# Patient Record
Sex: MALE | Race: WHITE | NOT HISPANIC OR LATINO | Employment: FULL TIME | URBAN - METROPOLITAN AREA
[De-identification: names, ages, dates, MRNs, and addresses within clinical notes are randomized per-mention and may not be internally consistent; named-entity substitution may affect disease eponyms.]

---

## 2021-03-28 ENCOUNTER — APPOINTMENT (OUTPATIENT)
Dept: VACCINE CLINIC | Facility: HOSPITAL | Age: 66
End: 2021-03-28

## 2022-07-08 ENCOUNTER — OFFICE VISIT (OUTPATIENT)
Dept: INTERNAL MEDICINE | Facility: CLINIC | Age: 67
End: 2022-07-08

## 2022-07-08 VITALS
SYSTOLIC BLOOD PRESSURE: 130 MMHG | HEIGHT: 73 IN | DIASTOLIC BLOOD PRESSURE: 86 MMHG | HEART RATE: 84 BPM | TEMPERATURE: 98 F | BODY MASS INDEX: 26 KG/M2 | OXYGEN SATURATION: 98 % | WEIGHT: 196.2 LBS

## 2022-07-08 DIAGNOSIS — F17.200 CURRENT EVERY DAY SMOKER: ICD-10-CM

## 2022-07-08 DIAGNOSIS — Z13.1 SCREENING FOR DIABETES MELLITUS: ICD-10-CM

## 2022-07-08 DIAGNOSIS — R03.0 BORDERLINE BLOOD PRESSURE: ICD-10-CM

## 2022-07-08 DIAGNOSIS — Z13.29 SCREENING FOR THYROID DISORDER: ICD-10-CM

## 2022-07-08 DIAGNOSIS — Z13.220 SCREENING, LIPID: ICD-10-CM

## 2022-07-08 DIAGNOSIS — Z76.89 ENCOUNTER TO ESTABLISH CARE: ICD-10-CM

## 2022-07-08 DIAGNOSIS — R55 SYNCOPE, TUSSIVE: ICD-10-CM

## 2022-07-08 DIAGNOSIS — R05.4 SYNCOPE, TUSSIVE: ICD-10-CM

## 2022-07-08 DIAGNOSIS — R94.31 ABNORMAL EKG: ICD-10-CM

## 2022-07-08 DIAGNOSIS — R41.89 CHANGE IN LEVEL OF CONSCIOUSNESS: Primary | ICD-10-CM

## 2022-07-08 DIAGNOSIS — Z78.9 DAILY CONSUMPTION OF ALCOHOL: ICD-10-CM

## 2022-07-08 PROCEDURE — 99204 OFFICE O/P NEW MOD 45 MIN: CPT | Performed by: NURSE PRACTITIONER

## 2022-07-08 PROCEDURE — 93000 ELECTROCARDIOGRAM COMPLETE: CPT | Performed by: NURSE PRACTITIONER

## 2022-07-08 NOTE — PROGRESS NOTES
"Date of Encounter: 2022  Patient: Chris Gaytan,  1955    Subjective     Chief complaint: Change in level of consciousness may be seizure    HPI   Patient here today establishing care.  Patient used to follow with Dr. Tanner Duvall, last seen 3 years ago.     Patient states that on  he had a witnessed change in his level of consciousness.  Patient states that he remembers coughing but blacked out during the event.  He said that his daughter reported his eyes being dilated.  States the event lasted a few seconds.  He said that it happened a couple times in the last few years but he has never been evaluated for it.  Patient states he had a cough for couple weeks but otherwise feels fine.    Patient states his daughter was worried for seizure and/or syncope.  Patient takes no medications.    Review of Systems:  Negative for fever, congestion, chest pain upon exertion, shortness of breath, vision changes, vomiting, dysuria, lymphadenopathy, muscle weakness, numbness, mood changes, rashes.    The following portions of the patient's history were reviewed and updated as appropriate: allergies, current medications, past family history, past medical history, past social history, past surgical history and problem list.    Patient Active Problem List   Diagnosis   • Current every day smoker   • Daily consumption of alcohol   • Borderline blood pressure     History reviewed. No pertinent past medical history.  History reviewed. No pertinent surgical history.  History reviewed. No pertinent family history.  No current outpatient medications on file.  No Known Allergies  Social History     Tobacco Use   • Smoking status: Current Every Day Smoker     Types: Cigarettes          Objective   Physical Exam   Vitals:    22 0921   BP: 130/86   Pulse: 84   Temp: 98 °F (36.7 °C)   TempSrc: Temporal   SpO2: 98%   Weight: 89 kg (196 lb 3.2 oz)   Height: 185.4 cm (73\")     Body mass index is 25.89 " kg/m².    Constitutional: NAD.  Eyes: EOMI. PERRLA. Normal conjunctiva.  Ear, nose, mouth, throat: Oral deferred due to pandemic. Normal external ear canals and TMs bilaterally.  Cardiovascular: RRR. No murmurs. No LE edema b/l. Radial pulses 2+ bilaterally.  Pulmonary: CTA b/l. Good effort.  Integumentary: No rashes or wounds on face or upper extremities.  Psychiatric: Normal affect. Normal thought content.          ECG 12 Lead    Date/Time: 7/8/2022 10:17 AM  Performed by: Kateryna Lambert APRN  Authorized by: Kateryna Lambert APRN   Comparison: not compared with previous ECG   Rhythm: sinus rhythm  Rate: normal    Clinical impression: abnormal EKG  Comments: Abnormality in I.                  Assessment & Plan   Assessment and Plan  Pleasant 66-year-old male with borderline blood pressure, daily consumption of alcohol and current everyday smoker here today establishing care.  The following was discussed:    Diagnoses and all orders for this visit:    1. Change in level of consciousness (Primary)  -     ECG 12 Lead  -     CBC & Differential  -     Comprehensive Metabolic Panel  -     Adult Transthoracic Echo Complete W/ Cont if Necessary Per Protocol  -     MRI Brain With & Without Contrast; Future  -     Duplex Carotid Ultrasound CAR    2. Syncope, tussive  -     Adult Transthoracic Echo Complete W/ Cont if Necessary Per Protocol  -     MRI Brain With & Without Contrast; Future  -     Duplex Carotid Ultrasound CAR    3. Borderline blood pressure  Assessment & Plan:  Pt has never taken any medication for BP. Will hold on medications today. Discussed about consequences of untreated HTN and red flag symptoms.     Orders:  -     ECG 12 Lead  -     CBC & Differential  -     Comprehensive Metabolic Panel  -     Duplex Carotid Ultrasound CAR    4. Current every day smoker  Overview:  2 packs per day    Orders:  -     CBC & Differential  -     Duplex Carotid Ultrasound CAR    5. Daily consumption of alcohol  Overview:  6  beers day.     Orders:  -     Comprehensive Metabolic Panel    6. Screening, lipid  -     Lipid Panel    7. Screening for diabetes mellitus  -     Hemoglobin A1c    8. Screening for thyroid disorder  -     Thyroid Panel With TSH    9. Abnormal EKG  -     Adult Transthoracic Echo Complete W/ Cont if Necessary Per Protocol  -     Duplex Carotid Ultrasound CAR    10. Encounter to establish care    Welcomed patient to practice. Discussed office policies. Reviewed patient reported medical history at bedside.    Educational information was given to patient printed with AVS in regards to alcohol consumption, smoking and blood pressure.  Red flag symptoms discussed with patient.    Patient verbalized understanding of and agreed to plan of care.    A total of 45 minutes of time was spent on this encounter today.  This includes reviewing the patient's records, face-to-face time, documentation.    Return in about 1 month (around 8/8/2022) for Annual physical.     Kateryna Lambert DNP, FNP-C  [facility]  O: [phone]      Please note that portions of this note were completed with a voice recognition program. Please call if questions.

## 2022-07-08 NOTE — PATIENT INSTRUCTIONS
Hypertension, Adult  Hypertension is another name for high blood pressure. High blood pressure forces your heart to work harder to pump blood. This can cause problems over time.  There are two numbers in a blood pressure reading. There is a top number (systolic) over a bottom number (diastolic). It is best to have a blood pressure that is below 120/80. Healthy choices can help lower your blood pressure, or you may need medicine to help lower it.  What are the causes?  The cause of this condition is not known. Some conditions may be related to high blood pressure.  What increases the risk?  Smoking.  Having type 2 diabetes mellitus, high cholesterol, or both.  Not getting enough exercise or physical activity.  Being overweight.  Having too much fat, sugar, calories, or salt (sodium) in your diet.  Drinking too much alcohol.  Having long-term (chronic) kidney disease.  Having a family history of high blood pressure.  Age. Risk increases with age.  Race. You may be at higher risk if you are .  Gender. Men are at higher risk than women before age 45. After age 65, women are at higher risk than men.  Having obstructive sleep apnea.  Stress.  What are the signs or symptoms?  High blood pressure may not cause symptoms. Very high blood pressure (hypertensive crisis) may cause:  Headache.  Feelings of worry or nervousness (anxiety).  Shortness of breath.  Nosebleed.  A feeling of being sick to your stomach (nausea).  Throwing up (vomiting).  Changes in how you see.  Very bad chest pain.  Seizures.  How is this treated?  This condition is treated by making healthy lifestyle changes, such as:  Eating healthy foods.  Exercising more.  Drinking less alcohol.  Your health care provider may prescribe medicine if lifestyle changes are not enough to get your blood pressure under control, and if:  Your top number is above 130.  Your bottom number is above 80.  Your personal target blood pressure may vary.  Follow  these instructions at home:  Eating and drinking    If told, follow the DASH eating plan. To follow this plan:  Fill one half of your plate at each meal with fruits and vegetables.  Fill one fourth of your plate at each meal with whole grains. Whole grains include whole-wheat pasta, brown rice, and whole-grain bread.  Eat or drink low-fat dairy products, such as skim milk or low-fat yogurt.  Fill one fourth of your plate at each meal with low-fat (lean) proteins. Low-fat proteins include fish, chicken without skin, eggs, beans, and tofu.  Avoid fatty meat, cured and processed meat, or chicken with skin.  Avoid pre-made or processed food.  Eat less than 1,500 mg of salt each day.  Do not drink alcohol if:  Your doctor tells you not to drink.  You are pregnant, may be pregnant, or are planning to become pregnant.  If you drink alcohol:  Limit how much you use to:  0-1 drink a day for women.  0-2 drinks a day for men.  Be aware of how much alcohol is in your drink. In the [place] one drink equals one 12 oz bottle of beer (355 mL), one 5 oz glass of wine (148 mL), or one 1½ oz glass of hard liquor (44 mL).    Lifestyle    Work with your doctor to stay at a healthy weight or to lose weight. Ask your doctor what the best weight is for you.  Get at least 30 minutes of exercise most days of the week. This may include walking, swimming, or biking.  Get at least 30 minutes of exercise that strengthens your muscles (resistance exercise) at least 3 days a week. This may include lifting weights or doing Pilates.  Do not use any products that contain nicotine or tobacco, such as cigarettes, e-cigarettes, and chewing tobacco. If you need help quitting, ask your doctor.  Check your blood pressure at home as told by your doctor.  Keep all follow-up visits as told by your doctor. This is important.    Medicines  Take over-the-counter and prescription medicines only as told by your doctor. Follow directions carefully.  Do not skip doses  of blood pressure medicine. The medicine does not work as well if you skip doses. Skipping doses also puts you at risk for problems.  Ask your doctor about side effects or reactions to medicines that you should watch for.  Contact a doctor if you:  Think you are having a reaction to the medicine you are taking.  Have headaches that keep coming back (recurring).  Feel dizzy.  Have swelling in your ankles.  Have trouble with your vision.  Get help right away if you:  Get a very bad headache.  Start to feel mixed up (confused).  Feel weak or numb.  Feel faint.  Have very bad pain in your:  Chest.  Belly (abdomen).  Throw up more than once.  Have trouble breathing.  Summary  Hypertension is another name for high blood pressure.  High blood pressure forces your heart to work harder to pump blood.  For most people, a normal blood pressure is less than 120/80.  Making healthy choices can help lower blood pressure. If your blood pressure does not get lower with healthy choices, you may need to take medicine.  This information is not intended to replace advice given to you by your health care provider. Make sure you discuss any questions you have with your health care provider.  Document Revised: 08/28/2019 Document Reviewed: 08/28/2019  [employer]apid[employer] Patient Education © 2021 [employer]

## 2022-07-08 NOTE — ASSESSMENT & PLAN NOTE
Pt has never taken any medication for BP. Will hold on medications today. Discussed about consequences of untreated HTN and red flag symptoms.

## 2022-07-09 LAB
ALBUMIN SERPL-MCNC: 4.4 G/DL (ref 3.8–4.8)
ALBUMIN/GLOB SERPL: 1.6 {RATIO} (ref 1.2–2.2)
ALP SERPL-CCNC: 90 IU/L (ref 44–121)
ALT SERPL-CCNC: 22 IU/L (ref 0–44)
AST SERPL-CCNC: 24 IU/L (ref 0–40)
BASOPHILS # BLD AUTO: 0 X10E3/UL (ref 0–0.2)
BASOPHILS NFR BLD AUTO: 0 %
BILIRUB SERPL-MCNC: 0.5 MG/DL (ref 0–1.2)
BUN SERPL-MCNC: 10 MG/DL (ref 8–27)
BUN/CREAT SERPL: 10 (ref 10–24)
CALCIUM SERPL-MCNC: 9.3 MG/DL (ref 8.6–10.2)
CHLORIDE SERPL-SCNC: 98 MMOL/L (ref 96–106)
CHOLEST SERPL-MCNC: 216 MG/DL (ref 100–199)
CO2 SERPL-SCNC: 24 MMOL/L (ref 20–29)
CREAT SERPL-MCNC: 0.96 MG/DL (ref 0.76–1.27)
EGFRCR SERPLBLD CKD-EPI 2021: 87 ML/MIN/1.73
EOSINOPHIL # BLD AUTO: 0 X10E3/UL (ref 0–0.4)
EOSINOPHIL NFR BLD AUTO: 0 %
ERYTHROCYTE [DISTWIDTH] IN BLOOD BY AUTOMATED COUNT: 13 % (ref 11.6–15.4)
FT4I SERPL CALC-MCNC: 1.7 (ref 1.2–4.9)
GLOBULIN SER CALC-MCNC: 2.7 G/DL (ref 1.5–4.5)
GLUCOSE SERPL-MCNC: 102 MG/DL (ref 65–99)
HBA1C MFR BLD: 5.5 % (ref 4.8–5.6)
HCT VFR BLD AUTO: 48.4 % (ref 37.5–51)
HDLC SERPL-MCNC: 60 MG/DL
HGB BLD-MCNC: 16.4 G/DL (ref 13–17.7)
IMM GRANULOCYTES # BLD AUTO: 0.1 X10E3/UL (ref 0–0.1)
IMM GRANULOCYTES NFR BLD AUTO: 1 %
LDLC SERPL CALC-MCNC: 130 MG/DL (ref 0–99)
LYMPHOCYTES # BLD AUTO: 1.7 X10E3/UL (ref 0.7–3.1)
LYMPHOCYTES NFR BLD AUTO: 21 %
MCH RBC QN AUTO: 31.7 PG (ref 26.6–33)
MCHC RBC AUTO-ENTMCNC: 33.9 G/DL (ref 31.5–35.7)
MCV RBC AUTO: 94 FL (ref 79–97)
MONOCYTES # BLD AUTO: 0.6 X10E3/UL (ref 0.1–0.9)
MONOCYTES NFR BLD AUTO: 8 %
NEUTROPHILS # BLD AUTO: 5.6 X10E3/UL (ref 1.4–7)
NEUTROPHILS NFR BLD AUTO: 70 %
PLATELET # BLD AUTO: 256 X10E3/UL (ref 150–450)
POTASSIUM SERPL-SCNC: 5.1 MMOL/L (ref 3.5–5.2)
PROT SERPL-MCNC: 7.1 G/DL (ref 6–8.5)
RBC # BLD AUTO: 5.17 X10E6/UL (ref 4.14–5.8)
SODIUM SERPL-SCNC: 138 MMOL/L (ref 134–144)
T3RU NFR SERPL: 24 % (ref 24–39)
T4 SERPL-MCNC: 6.9 UG/DL (ref 4.5–12)
TRIGL SERPL-MCNC: 146 MG/DL (ref 0–149)
TSH SERPL DL<=0.005 MIU/L-ACNC: 2.16 UIU/ML (ref 0.45–4.5)
VLDLC SERPL CALC-MCNC: 26 MG/DL (ref 5–40)
WBC # BLD AUTO: 8 X10E3/UL (ref 3.4–10.8)

## 2022-07-13 DIAGNOSIS — E78.00 PURE HYPERCHOLESTEROLEMIA: Primary | ICD-10-CM

## 2022-07-13 RX ORDER — SIMVASTATIN 20 MG
20 TABLET ORAL NIGHTLY
Qty: 90 TABLET | Refills: 3 | Status: SHIPPED | OUTPATIENT
Start: 2022-07-13

## 2022-07-21 ENCOUNTER — HOSPITAL ENCOUNTER (OUTPATIENT)
Dept: CARDIOLOGY | Facility: HOSPITAL | Age: 67
Discharge: HOME | End: 2022-07-21
Admitting: NURSE PRACTITIONER

## 2022-07-21 VITALS
WEIGHT: 195 LBS | DIASTOLIC BLOOD PRESSURE: 85 MMHG | HEIGHT: 73 IN | BODY MASS INDEX: 25.84 KG/M2 | HEART RATE: 70 BPM | SYSTOLIC BLOOD PRESSURE: 135 MMHG

## 2022-07-21 LAB
AORTIC ARCH: 2.7 CM
AORTIC DIMENSIONLESS INDEX: 1.1 (DI)
BH CV ECHO MEAS - ACS: 2.9 CM
BH CV ECHO MEAS - AO MAX PG: 4.6 MMHG
BH CV ECHO MEAS - AO MEAN PG: 2.28 MMHG
BH CV ECHO MEAS - AO V2 MAX: 106.7 CM/SEC
BH CV ECHO MEAS - AO V2 VTI: 19 CM
BH CV ECHO MEAS - AVA PLANIMETRY TRACED: 4 CM2
BH CV ECHO MEAS - AVA(I,D): 4.1 CM2
BH CV ECHO MEAS - EDV(CUBED): 39.6 ML
BH CV ECHO MEAS - EDV(MOD-SP2): 49 ML
BH CV ECHO MEAS - EDV(MOD-SP4): 46 ML
BH CV ECHO MEAS - EF(MOD-BP): 63.1 %
BH CV ECHO MEAS - EF(MOD-SP2): 67.3 %
BH CV ECHO MEAS - EF(MOD-SP4): 60.9 %
BH CV ECHO MEAS - ESV(CUBED): 15.1 ML
BH CV ECHO MEAS - ESV(MOD-SP2): 16 ML
BH CV ECHO MEAS - ESV(MOD-SP4): 18 ML
BH CV ECHO MEAS - FS: 27.5 %
BH CV ECHO MEAS - IVS/LVPW: 1.01 CM
BH CV ECHO MEAS - IVSD: 0.91 CM
BH CV ECHO MEAS - LAT PEAK E' VEL: 8.1 CM/SEC
BH CV ECHO MEAS - LV MASS(C)D: 85.9 GRAMS
BH CV ECHO MEAS - LV MAX PG: 5 MMHG
BH CV ECHO MEAS - LV MEAN PG: 2.06 MMHG
BH CV ECHO MEAS - LV V1 MAX: 112.1 CM/SEC
BH CV ECHO MEAS - LV V1 VTI: 21.9 CM
BH CV ECHO MEAS - LVIDD: 3.4 CM
BH CV ECHO MEAS - LVIDS: 2.47 CM
BH CV ECHO MEAS - LVOT AREA: 3.6 CM2
BH CV ECHO MEAS - LVOT DIAM: 2.14 CM
BH CV ECHO MEAS - LVPWD: 0.9 CM
BH CV ECHO MEAS - MED PEAK E' VEL: 7.3 CM/SEC
BH CV ECHO MEAS - MV A DUR: 0.12 SEC
BH CV ECHO MEAS - MV A MAX VEL: 79.3 CM/SEC
BH CV ECHO MEAS - MV DEC SLOPE: 276.4 CM/SEC2
BH CV ECHO MEAS - MV DEC TIME: 0.29 MSEC
BH CV ECHO MEAS - MV E MAX VEL: 78.8 CM/SEC
BH CV ECHO MEAS - MV E/A: 0.99
BH CV ECHO MEAS - MV MAX PG: 4.6 MMHG
BH CV ECHO MEAS - MV MEAN PG: 1.73 MMHG
BH CV ECHO MEAS - MV V2 VTI: 21.6 CM
BH CV ECHO MEAS - MVA(VTI): 3.6 CM2
BH CV ECHO MEAS - PA V2 MAX: 83.6 CM/SEC
BH CV ECHO MEAS - PULM A REVS DUR: 0.13 SEC
BH CV ECHO MEAS - PULM A REVS VEL: 29 CM/SEC
BH CV ECHO MEAS - PULM DIAS VEL: 33.7 CM/SEC
BH CV ECHO MEAS - PULM S/D: 1.94
BH CV ECHO MEAS - PULM SYS VEL: 65.4 CM/SEC
BH CV ECHO MEAS - RV MAX PG: 5.7 MMHG
BH CV ECHO MEAS - RV V1 MAX: 119.3 CM/SEC
BH CV ECHO MEAS - RV V1 VTI: 19 CM
BH CV ECHO MEAS - SV(LVOT): 78.6 ML
BH CV ECHO MEAS - SV(MOD-SP2): 33 ML
BH CV ECHO MEAS - SV(MOD-SP4): 28 ML
BH CV ECHO MEAS - TAPSE (>1.6): 2.2 CM
BH CV ECHO MEAS - TR MAX PG: 4.7 MMHG
BH CV ECHO MEAS - TR MAX VEL: 108.6 CM/SEC
BH CV ECHO MEASUREMENTS AVERAGE E/E' RATIO: 10.23
BH CV XLRA - RV BASE: 3.6 CM
BH CV XLRA - RV LENGTH: 6.5 CM
BH CV XLRA - RV MID: 2.9 CM
BH CV XLRA - TDI S': 8.8 CM/SEC
LEFT ATRIUM VOLUME INDEX: 10.8 ML/M2
MAXIMAL PREDICTED HEART RATE: 154 BPM
SINUS: 3.2 CM
STJ: 2.48 CM
STRESS TARGET HR: 131 BPM

## 2022-07-21 PROCEDURE — 93306 TTE W/DOPPLER COMPLETE: CPT

## 2022-07-21 PROCEDURE — 93306 TTE W/DOPPLER COMPLETE: CPT | Performed by: INTERNAL MEDICINE

## 2022-08-03 ENCOUNTER — HOSPITAL ENCOUNTER (OUTPATIENT)
Dept: CARDIOLOGY | Facility: HOSPITAL | Age: 67
Discharge: HOME | End: 2022-08-03

## 2022-08-03 ENCOUNTER — HOSPITAL ENCOUNTER (OUTPATIENT)
Dept: MRI IMAGING | Facility: HOSPITAL | Age: 67
Discharge: HOME | End: 2022-08-03

## 2022-08-03 DIAGNOSIS — R41.89 CHANGE IN LEVEL OF CONSCIOUSNESS: ICD-10-CM

## 2022-08-03 DIAGNOSIS — R05.4 SYNCOPE, TUSSIVE: ICD-10-CM

## 2022-08-03 DIAGNOSIS — R55 SYNCOPE, TUSSIVE: ICD-10-CM

## 2022-08-03 LAB
BH CV XLRA MEAS LEFT DIST CCA EDV: -21.1 CM/SEC
BH CV XLRA MEAS LEFT DIST CCA PSV: -79.7 CM/SEC
BH CV XLRA MEAS LEFT DIST ICA EDV: 24.6 CM/SEC
BH CV XLRA MEAS LEFT DIST ICA PSV: 76.2 CM/SEC
BH CV XLRA MEAS LEFT ICA/CCA RATIO: 0.97
BH CV XLRA MEAS LEFT MID ICA EDV: -27.6 CM/SEC
BH CV XLRA MEAS LEFT MID ICA PSV: -77.4 CM/SEC
BH CV XLRA MEAS LEFT PROX CCA EDV: -22 CM/SEC
BH CV XLRA MEAS LEFT PROX CCA PSV: -109 CM/SEC
BH CV XLRA MEAS LEFT PROX ECA EDV: -33.4 CM/SEC
BH CV XLRA MEAS LEFT PROX ECA PSV: -136 CM/SEC
BH CV XLRA MEAS LEFT PROX ICA EDV: -23.5 CM/SEC
BH CV XLRA MEAS LEFT PROX ICA PSV: -69.2 CM/SEC
BH CV XLRA MEAS LEFT PROX SCLA PSV: 97.9 CM/SEC
BH CV XLRA MEAS LEFT VERTEBRAL A EDV: 13 CM/SEC
BH CV XLRA MEAS LEFT VERTEBRAL A PSV: 43.2 CM/SEC
BH CV XLRA MEAS RIGHT DIST CCA EDV: -23.5 CM/SEC
BH CV XLRA MEAS RIGHT DIST CCA PSV: -76.8 CM/SEC
BH CV XLRA MEAS RIGHT DIST ICA EDV: -31.7 CM/SEC
BH CV XLRA MEAS RIGHT DIST ICA PSV: -87.9 CM/SEC
BH CV XLRA MEAS RIGHT ICA/CCA RATIO: 1.14
BH CV XLRA MEAS RIGHT MID ICA EDV: -23.5 CM/SEC
BH CV XLRA MEAS RIGHT MID ICA PSV: -72.7 CM/SEC
BH CV XLRA MEAS RIGHT PROX CCA EDV: -22.8 CM/SEC
BH CV XLRA MEAS RIGHT PROX CCA PSV: -106 CM/SEC
BH CV XLRA MEAS RIGHT PROX ECA EDV: -27.9 CM/SEC
BH CV XLRA MEAS RIGHT PROX ECA PSV: -104 CM/SEC
BH CV XLRA MEAS RIGHT PROX ICA EDV: -21.7 CM/SEC
BH CV XLRA MEAS RIGHT PROX ICA PSV: -83.3 CM/SEC
BH CV XLRA MEAS RIGHT PROX SCLA PSV: 90.4 CM/SEC
BH CV XLRA MEAS RIGHT VERTEBRAL A EDV: 13.7 CM/SEC
BH CV XLRA MEAS RIGHT VERTEBRAL A PSV: 51.5 CM/SEC
LEFT ARM BP: NORMAL MMHG
MAXIMAL PREDICTED HEART RATE: 154 BPM
RIGHT ARM BP: NORMAL MMHG
STRESS TARGET HR: 131 BPM

## 2022-08-03 PROCEDURE — 93880 EXTRACRANIAL BILAT STUDY: CPT

## 2022-08-04 ENCOUNTER — TELEPHONE (OUTPATIENT)
Dept: INTERNAL MEDICINE | Facility: CLINIC | Age: 67
End: 2022-08-04

## 2022-08-04 NOTE — TELEPHONE ENCOUNTER
Caller: Chris Gaytan    Relationship: Self    Best call back number: [phone]    What medication are you requesting: SOMETHING TO CALM HIM DOWN LIKE VALUIM    What are your current symptoms: PANIC ATTACK      If a prescription is needed, what is your preferred pharmacy and phone number: [facility] [place] - [address] [facility] AT [address] - [phone] [facility] [phone] FX     Additional notes: HE WENT IN FOR AN MRI AND HAD A PANIC ATTACK.  HE WOULD LIKE SOMETHING TO HELP HIM WHEN HE GOES BACK 8/26/22 FOR ANOTHER MRI

## 2022-08-05 RX ORDER — DIAZEPAM 2 MG/1
TABLET ORAL
Qty: 5 TABLET | Refills: 0 | Status: SHIPPED | OUTPATIENT
Start: 2022-08-05 | End: 2023-02-07

## 2022-08-26 ENCOUNTER — HOSPITAL ENCOUNTER (OUTPATIENT)
Dept: MRI IMAGING | Facility: HOSPITAL | Age: 67
Discharge: HOME | End: 2022-08-26
Admitting: NURSE PRACTITIONER

## 2022-08-26 PROCEDURE — 82565 ASSAY OF CREATININE: CPT

## 2022-08-26 PROCEDURE — 0 GADOBENATE DIMEGLUMINE 529 MG/ML SOLUTION: Performed by: NURSE PRACTITIONER

## 2022-08-26 PROCEDURE — 70553 MRI BRAIN STEM W/O & W/DYE: CPT

## 2022-08-26 PROCEDURE — A9577 INJ MULTIHANCE: HCPCS | Performed by: NURSE PRACTITIONER

## 2022-08-26 RX ADMIN — GADOBENATE DIMEGLUMINE 18 ML: 529 INJECTION, SOLUTION INTRAVENOUS at 08:16

## 2022-08-30 ENCOUNTER — OFFICE VISIT (OUTPATIENT)
Dept: INTERNAL MEDICINE | Facility: CLINIC | Age: 67
End: 2022-08-30

## 2022-08-30 VITALS
DIASTOLIC BLOOD PRESSURE: 88 MMHG | HEART RATE: 81 BPM | OXYGEN SATURATION: 100 % | HEIGHT: 73 IN | SYSTOLIC BLOOD PRESSURE: 144 MMHG | TEMPERATURE: 97.3 F | WEIGHT: 198.6 LBS | BODY MASS INDEX: 26.32 KG/M2

## 2022-08-30 DIAGNOSIS — Z00.00 ANNUAL PHYSICAL EXAM: Primary | ICD-10-CM

## 2022-08-30 DIAGNOSIS — Z23 NEED FOR PROPHYLACTIC VACCINATION AGAINST STREPTOCOCCUS PNEUMONIAE (PNEUMOCOCCUS): ICD-10-CM

## 2022-08-30 DIAGNOSIS — I67.9 CEREBROVASCULAR SMALL VESSEL DISEASE: ICD-10-CM

## 2022-08-30 DIAGNOSIS — R03.0 BORDERLINE BLOOD PRESSURE: ICD-10-CM

## 2022-08-30 DIAGNOSIS — Z78.9 DAILY CONSUMPTION OF ALCOHOL: ICD-10-CM

## 2022-08-30 DIAGNOSIS — F17.200 CURRENT EVERY DAY SMOKER: ICD-10-CM

## 2022-08-30 DIAGNOSIS — I63.81 LACUNAR INFARCTION: ICD-10-CM

## 2022-08-30 DIAGNOSIS — Z12.11 COLON CANCER SCREENING: ICD-10-CM

## 2022-08-30 DIAGNOSIS — Z13.6 SCREENING FOR AAA (ABDOMINAL AORTIC ANEURYSM): ICD-10-CM

## 2022-08-30 PROCEDURE — 90677 PCV20 VACCINE IM: CPT | Performed by: NURSE PRACTITIONER

## 2022-08-30 PROCEDURE — 90471 IMMUNIZATION ADMIN: CPT | Performed by: NURSE PRACTITIONER

## 2022-08-30 PROCEDURE — 99397 PER PM REEVAL EST PAT 65+ YR: CPT | Performed by: NURSE PRACTITIONER

## 2022-08-30 RX ORDER — ASPIRIN 81 MG/1
81 TABLET ORAL DAILY
Qty: 90 TABLET | Refills: 3 | Status: SHIPPED | OUTPATIENT
Start: 2022-08-30

## 2022-08-30 NOTE — ASSESSMENT & PLAN NOTE
Discussed with patient about importance of maintaining good blood pressure.  Gave patient educational handout on low-sodium diet.  Discussed about lifestyle modifications.  Pressure rechecked and within normal range.  Discussed red flag symptoms of hypertension.  We will continue to monitor.

## 2022-08-30 NOTE — PROGRESS NOTES
Date of Encounter: 2022  Patient: Chris Gaytan,  1955    Subjective     Chief complaint: Annual physical    HPI   Patient here today for his annual exam.  Patient states that he is feeling well.  Patient would like to go over the last results of his testing.    Patient states that he is up-to-date on all of his COVID shots.  Patient is agreeable to update his pneumonia vaccine today.    Patient does not currently have an exercise routine but states that he stays active around the house.  Patient continues to consume beer on a daily basis but is trying to cut back.  Patient has gone from smoking 2 packs/day to a pack and a half.    Patient does not follow with any other specialist at this time.  No acute concerns today.    Patient states that he has not had any other syncope episodes since his last visit.  No issues with cough.        Review of Systems:  Negative for fever, congestion, chest pain upon exertion, shortness of breath, vision changes, vomiting, dysuria, lymphadenopathy, muscle weakness, numbness, mood changes, rashes.    The following portions of the patient's history were reviewed and updated as appropriate: allergies, current medications, past family history, past medical history, past social history, past surgical history and problem list.    Patient Active Problem List   Diagnosis   • Current every day smoker   • Daily consumption of alcohol   • Borderline blood pressure   • Cerebrovascular small vessel disease   • Lacunar infarction (HCC)     History reviewed. No pertinent past medical history.  History reviewed. No pertinent surgical history.  History reviewed. No pertinent family history.    Current Outpatient Medications:   •  simvastatin (Zocor) 20 MG tablet, Take 1 tablet by mouth Every Night., Disp: 90 tablet, Rfl: 3  •  aspirin (aspirin) 81 MG EC tablet, Take 1 tablet by mouth Daily., Disp: 90 tablet, Rfl: 3  •  diazePAM (Valium) 2 MG tablet, Take 1/2 to 1 tablet as  "needed for anxiety. May repeat 1 tablet in 1 hour if not effective., Disp: 5 tablet, Rfl: 0  Allergies   Allergen Reactions   • Penicillins Unknown - High Severity     Was told to not take it as child     Social History     Tobacco Use   • Smoking status: Current Every Day Smoker     Types: Cigarettes          Objective   Physical Exam   Vitals:    08/30/22 0745   BP: 144/88   Pulse: 81   Temp: 97.3 °F (36.3 °C)   TempSrc: Temporal   SpO2: 100%   Weight: 90.1 kg (198 lb 9.6 oz)   Height: 185.4 cm (73\")     Body mass index is 26.2 kg/m².    Constitutional: NAD.  Eyes: EOMI. PERRLA. Normal conjunctiva.  Ear, nose, mouth, throat: No tonsillar exudates or erythema.   Normal nasal mucosa. Normal external ear canals and TMs bilaterally.  Cardiovascular: RRR. No murmurs. No LE edema b/l. Radial pulses 2+ bilaterally.  Pulmonary: CTA b/l. Good effort.  Integumentary: No rashes or wounds on face or upper extremities.  Lymphatic: No anterior cervical lymphadenopathy.  Endocrine: No thyromegaly or palpable thyroid nodules.  Psychiatric: Normal affect. Normal thought content.           Assessment & Plan   Assessment and Plan  Pleasant 66-year-old male with borderline blood pressure, daily consumption of alcohol, current everyday smoker and others here today for annual physical.  The following was discussed:    Diagnoses and all orders for this visit:    1. Annual physical exam (Primary)  -     Pneumococcal Conjugate Vaccine 20-Valent (PCV20)  -     Ambulatory Referral For Screening Colonoscopy  -     [place] aaa screen limited   We discussed preventative care including age and patient-appropriate immunizations and cancer screening. We also discussed the importance of exercise and a healthy diet. This is their annual preventative exam.    2. Need for prophylactic vaccination against Streptococcus pneumoniae (pneumococcus)  -     Pneumococcal Conjugate Vaccine 20-Valent (PCV20)    3. Current every day smoker  Overview:  1.5 packs per " day    Assessment & Plan:  Pt has cut back 1/2 pack per day from 2.   Discussed with patient the risks associated with smoking and his health.   Discussed about smoking cessation options at length.   Pt not ready to stop but is going to continue to try and reduce how much he smokes per day.     Orders:  -     Pneumococcal Conjugate Vaccine 20-Valent (PCV20)  -      aaa screen limited    4. Colon cancer screening  -     Ambulatory Referral For Screening Colonoscopy    5. Screening for AAA (abdominal aortic aneurysm)  -      aaa screen limited    6. Daily consumption of alcohol  Overview:  6 beers day.     Assessment & Plan:  Discussed with patient the importance of cutting back on ETOH.       7. Cerebrovascular small vessel disease  Overview:  Found on MRI of Brain 8/2022.    Assessment & Plan:  Reviewed MRI with patient. Discussed about disease. Answered some questions from the patient.     Orders:  -     aspirin (aspirin) 81 MG EC tablet; Take 1 tablet by mouth Daily.  Dispense: 90 tablet; Refill: 3    8. Lacunar infarction (HCC)  Overview:  Finding noted old infarct on MRI 8/2022.     Assessment & Plan:  Discussed with patient about diagnosis. Discussed about secondary prevention.     Orders:  -     aspirin (aspirin) 81 MG EC tablet; Take 1 tablet by mouth Daily.  Dispense: 90 tablet; Refill: 3    9. Borderline blood pressure  Assessment & Plan:  Discussed with patient about importance of maintaining good blood pressure.  Gave patient educational handout on low-sodium diet.  Discussed about lifestyle modifications.  Pressure rechecked and within normal range.  Discussed red flag symptoms of hypertension.  We will continue to monitor.         Patient verbalized understanding of and agreed to plan of care.  Return in about 6 months (around 2/28/2023) for Fasting Labs, Follow Up.     Kateryna Lambert DNP, FNP-C  Family Medicine  O: [phone]      Please note that portions of this note were completed with a voice  recognition program. Please call if questions.

## 2022-08-30 NOTE — ASSESSMENT & PLAN NOTE
Pt has cut back 1/2 pack per day from 2.   Discussed with patient the risks associated with smoking and his health.   Discussed about smoking cessation options at length.   Pt not ready to stop but is going to continue to try and reduce how much he smokes per day.

## 2022-09-07 ENCOUNTER — HOSPITAL ENCOUNTER (OUTPATIENT)
Dept: ULTRASOUND IMAGING | Facility: HOSPITAL | Age: 67
Discharge: HOME | End: 2022-09-07
Admitting: NURSE PRACTITIONER

## 2022-09-07 PROCEDURE — 76706 US ABDL AORTA SCREEN AAA: CPT

## 2022-09-09 ENCOUNTER — PRE-PROCEDURE SCREENING (OUTPATIENT)
Dept: GASTROENTEROLOGY | Facility: CLINIC | Age: 67
End: 2022-09-09

## 2022-09-20 LAB — CREAT BLDA-MCNC: 1 MG/DL (ref 0.6–1.3)

## 2022-11-03 ENCOUNTER — PREP FOR SURGERY (OUTPATIENT)
Dept: OTHER | Facility: HOSPITAL | Age: 67
End: 2022-11-03

## 2022-11-03 DIAGNOSIS — Z12.11 COLON CANCER SCREENING: Primary | ICD-10-CM

## 2022-11-03 RX ORDER — SODIUM CHLORIDE, SODIUM LACTATE, POTASSIUM CHLORIDE, CALCIUM CHLORIDE 600; 310; 30; 20 MG/100ML; MG/100ML; MG/100ML; MG/100ML
30 INJECTION, SOLUTION INTRAVENOUS CONTINUOUS
Status: CANCELLED | OUTPATIENT
Start: 2023-01-17

## 2022-11-07 ENCOUNTER — PREP FOR SURGERY (OUTPATIENT)
Dept: OTHER | Facility: HOSPITAL | Age: 67
End: 2022-11-07

## 2022-11-11 ENCOUNTER — TELEPHONE (OUTPATIENT)
Dept: GASTROENTEROLOGY | Facility: CLINIC | Age: 67
End: 2022-11-11

## 2022-11-11 NOTE — TELEPHONE ENCOUNTER
Caller: Chris Gaytan    Relationship to patient: Self    Best call back number:  [phone]     Chief complaint: PT WANTS TO SCHEDULE COLONOSCOPY    Type of visit: COLONOSCOPY    Requested date: FIRST AVAILABLE     If rescheduling, when is the original appointment:      Additional notes:

## 2022-11-17 PROBLEM — Z12.11 COLON CANCER SCREENING: Status: ACTIVE | Noted: 2022-11-17

## 2022-11-17 NOTE — TELEPHONE ENCOUNTER
JORGE ALBERTO Quick for COLONOSCOPY  on 1/17/23 arrive at 8am.Prep instructions mailed to address on file.

## 2022-12-01 ENCOUNTER — TELEPHONE (OUTPATIENT)
Dept: GASTROENTEROLOGY | Facility: CLINIC | Age: 67
End: 2022-12-01

## 2022-12-01 NOTE — TELEPHONE ENCOUNTER
Caller: Chris Gaytan    Relationship to patient: Self    Best call back number: [phone]    Type of visit: RESCHEDULE COLONOSCOPY.    Requested date: NEXT AVAILABLE AFTER THE DATE.     If rescheduling, when is the original appointment: 1/17/23     Additional notes:PATIENT CALLED IN ASKING TO RESCHEDULE COLONOSCOPY.

## 2022-12-06 ENCOUNTER — TELEPHONE (OUTPATIENT)
Dept: GASTROENTEROLOGY | Facility: CLINIC | Age: 67
End: 2022-12-06

## 2022-12-06 NOTE — TELEPHONE ENCOUNTER
JORGE ALBERTO James for COLONOSCOPY  on 2/8/23 arrive at 1230pm.Prep instructions mailed to address on file.

## 2023-02-08 ENCOUNTER — HOSPITAL ENCOUNTER (OUTPATIENT)
Facility: HOSPITAL | Age: 68
Setting detail: HOSPITAL OUTPATIENT SURGERY
Discharge: HOME | End: 2023-02-08
Attending: INTERNAL MEDICINE | Admitting: INTERNAL MEDICINE
Payer: COMMERCIAL

## 2023-02-08 ENCOUNTER — ANESTHESIA (OUTPATIENT)
Dept: GASTROENTEROLOGY | Facility: HOSPITAL | Age: 68
End: 2023-02-08
Payer: COMMERCIAL

## 2023-02-08 ENCOUNTER — ANESTHESIA EVENT (OUTPATIENT)
Dept: GASTROENTEROLOGY | Facility: HOSPITAL | Age: 68
End: 2023-02-08
Payer: COMMERCIAL

## 2023-02-08 VITALS
HEART RATE: 72 BPM | BODY MASS INDEX: 25.87 KG/M2 | OXYGEN SATURATION: 96 % | SYSTOLIC BLOOD PRESSURE: 142 MMHG | HEIGHT: 73 IN | RESPIRATION RATE: 16 BRPM | TEMPERATURE: 98.2 F | DIASTOLIC BLOOD PRESSURE: 88 MMHG | WEIGHT: 195.2 LBS

## 2023-02-08 DIAGNOSIS — Z12.11 COLON CANCER SCREENING: ICD-10-CM

## 2023-02-08 PROCEDURE — 25010000002 PROPOFOL 200 MG/20ML EMULSION: Performed by: ANESTHESIOLOGY

## 2023-02-08 PROCEDURE — 45385 COLONOSCOPY W/LESION REMOVAL: CPT | Performed by: INTERNAL MEDICINE

## 2023-02-08 PROCEDURE — 88305 TISSUE EXAM BY PATHOLOGIST: CPT | Performed by: INTERNAL MEDICINE

## 2023-02-08 RX ORDER — SODIUM CHLORIDE 9 MG/ML
40 INJECTION, SOLUTION INTRAVENOUS AS NEEDED
Status: DISCONTINUED | OUTPATIENT
Start: 2023-02-08 | End: 2023-02-08 | Stop reason: HOSPADM

## 2023-02-08 RX ORDER — PROPOFOL 10 MG/ML
INJECTION, EMULSION INTRAVENOUS CONTINUOUS PRN
Status: DISCONTINUED | OUTPATIENT
Start: 2023-02-08 | End: 2023-02-08 | Stop reason: SURG

## 2023-02-08 RX ORDER — SODIUM CHLORIDE 0.9 % (FLUSH) 0.9 %
10 SYRINGE (ML) INJECTION AS NEEDED
Status: DISCONTINUED | OUTPATIENT
Start: 2023-02-08 | End: 2023-02-08 | Stop reason: HOSPADM

## 2023-02-08 RX ORDER — SODIUM CHLORIDE, SODIUM LACTATE, POTASSIUM CHLORIDE, CALCIUM CHLORIDE 600; 310; 30; 20 MG/100ML; MG/100ML; MG/100ML; MG/100ML
30 INJECTION, SOLUTION INTRAVENOUS CONTINUOUS
Status: DISCONTINUED | OUTPATIENT
Start: 2023-02-08 | End: 2023-02-08 | Stop reason: HOSPADM

## 2023-02-08 RX ORDER — SODIUM CHLORIDE 0.9 % (FLUSH) 0.9 %
10 SYRINGE (ML) INJECTION EVERY 12 HOURS SCHEDULED
Status: DISCONTINUED | OUTPATIENT
Start: 2023-02-08 | End: 2023-02-08 | Stop reason: HOSPADM

## 2023-02-08 RX ADMIN — PROPOFOL 200 MCG/KG/MIN: 10 INJECTION, EMULSION INTRAVENOUS at 13:13

## 2023-02-08 RX ADMIN — SODIUM CHLORIDE, POTASSIUM CHLORIDE, SODIUM LACTATE AND CALCIUM CHLORIDE 30 ML/HR: 600; 310; 30; 20 INJECTION, SOLUTION INTRAVENOUS at 12:55

## 2023-02-08 NOTE — ANESTHESIA POSTPROCEDURE EVALUATION
Patient: Chris Gaytan    Procedure Summary     Date: 02/08/23 Room / Location:  [facility] /  [facility]    Anesthesia Start: 1311 Anesthesia Stop: 1347    Procedure: COLONOSCOPY to cecum with hot and cold snare polypectomies Diagnosis:       Colon cancer screening      (Colon cancer screening [Z12.11])    Surgeons: Brock Bhatt MD Provider: Jose Vital MD    Anesthesia Type: MAC ASA Status: 2          Anesthesia Type: MAC    Vitals  Vitals Value Taken Time   /88 02/08/23 1402   Temp 36.8 °C (98.2 °F) 02/08/23 1352   Pulse 72 02/08/23 1402   Resp 16 02/08/23 1402   SpO2 96 % 02/08/23 1402           Post Anesthesia Care and Evaluation    Patient location during evaluation: PACU  Patient participation: complete - patient participated  Level of consciousness: awake  Pain scale: See nurse's notes for pain score.  Pain management: adequate    Airway patency: patent  Anesthetic complications: No anesthetic complications  PONV Status: none  Cardiovascular status: acceptable  Respiratory status: acceptable  Hydration status: acceptable    Comments: Patient seen and examined postoperatively; vital signs stable; SpO2 greater than or equal to 90%; cardiopulmonary status stable; nausea/vomiting adequately controlled; pain adequately controlled; no apparent anesthesia complications; patient discharged from anesthesia care when discharge criteria were met

## 2023-02-08 NOTE — H&P
"[facility]  Pre Procedure History & Physical    Chief Complaint:   Time for my colonoscopy    Subjective     HPI:   67 y.o. male presenting to endoscopy unit today for screening colonoscopy.    Past Medical History:   Past Medical History:   Diagnosis Date   • Hyperlipidemia        Family History:  Family History   Problem Relation Age of Onset   • Malig Hyperthermia Neg Hx        Social History:   reports that he has been smoking cigarettes. He does not have any smokeless tobacco history on file. He reports that he does not drink alcohol and does not use drugs.    Medications:   Medications Prior to Admission   Medication Sig Dispense Refill Last Dose   • aspirin (aspirin) 81 MG EC tablet Take 1 tablet by mouth Daily. (Patient taking differently: Take 81 mg by mouth Every Night.) 90 tablet 3    • simvastatin (Zocor) 20 MG tablet Take 1 tablet by mouth Every Night. 90 tablet 3        Allergies:  Penicillins      Objective     Height 185.4 cm (73\"), weight 86.2 kg (190 lb).    Assessment & Plan     Diagnosis:  Encounter for screening for colon cancer    Anticipated Surgical Procedure:  Colonoscopy    The risks, benefits, and alternatives of this procedure have been discussed with the patient or the responsible party- the patient understands and agrees to proceed.                                                                "

## 2023-02-08 NOTE — ANESTHESIA PREPROCEDURE EVALUATION
Anesthesia Evaluation     NPO Solid Status: > 8 hours  NPO Liquid Status: > 8 hours           Airway   Mallampati: I  TM distance: >3 FB  No difficulty expected  Dental      Pulmonary    (+) a smoker Current Smoked day of surgery,   Cardiovascular   Exercise tolerance: good (4-7 METS)    (+) hyperlipidemia,       Neuro/Psych  GI/Hepatic/Renal/Endo      Musculoskeletal     Abdominal    Substance History      OB/GYN          Other                        Anesthesia Plan    ASA 2     MAC     intravenous induction     Anesthetic plan, risks, benefits, and alternatives have been provided, discussed and informed consent has been obtained with: patient.        CODE STATUS:

## 2023-02-08 NOTE — DISCHARGE INSTRUCTIONS
For the next 24 hours patient needs to be with a responsible adult.    For 24 hours DO NOT drive, operate machinery, appliances, drink alcohol, make important decisions or sign legal documents.    Start with a light or bland diet if you are feeling sick to your stomach otherwise advance to regular diet as tolerated.    Follow recommendations on procedure report if provided by your doctor.    Call Dr Bhatt for problems . Problems may include but not limited to: large amounts of bleeding, trouble breathing, repeated vomiting, severe unrelieved pain, fever or chills.

## 2023-02-09 LAB
LAB AP CASE REPORT: NORMAL
PATH REPORT.FINAL DX SPEC: NORMAL
PATH REPORT.GROSS SPEC: NORMAL

## 2023-02-21 ENCOUNTER — TELEPHONE (OUTPATIENT)
Dept: GASTROENTEROLOGY | Facility: CLINIC | Age: 68
End: 2023-02-21
Payer: COMMERCIAL

## 2023-02-21 NOTE — TELEPHONE ENCOUNTER
----- Message from Brock Bhatt MD sent at 2/21/2023 10:12 AM EST -----  Mixture of precancerous polyps and benign polyps  Recall 3 years

## (undated) DEVICE — THE SINGLE USE ETRAP – POLYP TRAP IS USED FOR SUCTION RETRIEVAL OF ENDOSCOPICALLY REMOVED POLYPS.: Brand: ETRAP

## (undated) DEVICE — LN SMPL CO2 SHTRM SD STREAM W/M LUER

## (undated) DEVICE — SENSR O2 OXIMAX FNGR A/ 18IN NONSTR

## (undated) DEVICE — SNAR POLYP SENSATION STDOVL 27 240 BX40

## (undated) DEVICE — SNAR POLYP CAPTIVATOR RND STFF 2.4 240CM 10MM 1P/U

## (undated) DEVICE — KT ORCA ORCAPOD DISP STRL

## (undated) DEVICE — TUBING, SUCTION, 1/4" X 10', STRAIGHT: Brand: MEDLINE

## (undated) DEVICE — CANN O2 ETCO2 FITS ALL CONN CO2 SMPL A/ 7IN DISP LF

## (undated) DEVICE — ADAPT CLN BIOGUARD AIR/H2O DISP